# Patient Record
Sex: MALE | Race: WHITE | NOT HISPANIC OR LATINO | Employment: OTHER | URBAN - METROPOLITAN AREA
[De-identification: names, ages, dates, MRNs, and addresses within clinical notes are randomized per-mention and may not be internally consistent; named-entity substitution may affect disease eponyms.]

---

## 2017-12-15 ENCOUNTER — OFFICE VISIT (OUTPATIENT)
Dept: WOUND CARE | Facility: CLINIC | Age: 82
End: 2017-12-15
Payer: MEDICARE

## 2017-12-15 VITALS
SYSTOLIC BLOOD PRESSURE: 168 MMHG | WEIGHT: 132.94 LBS | HEART RATE: 73 BPM | BODY MASS INDEX: 19.07 KG/M2 | DIASTOLIC BLOOD PRESSURE: 80 MMHG

## 2017-12-15 DIAGNOSIS — Z43.6 ATTENTION TO UROSTOMY: Primary | ICD-10-CM

## 2017-12-15 PROCEDURE — 99203 OFFICE O/P NEW LOW 30 MIN: CPT | Mod: PBBFAC | Performed by: CLINICAL NURSE SPECIALIST

## 2017-12-15 PROCEDURE — 99999 PR PBB SHADOW E&M-NEW PATIENT-LVL III: CPT | Mod: PBBFAC,,, | Performed by: CLINICAL NURSE SPECIALIST

## 2017-12-15 PROCEDURE — 99202 OFFICE O/P NEW SF 15 MIN: CPT | Mod: S$PBB,,, | Performed by: CLINICAL NURSE SPECIALIST

## 2017-12-15 RX ORDER — LOSARTAN POTASSIUM 50 MG/1
50 TABLET ORAL DAILY
COMMUNITY

## 2017-12-15 NOTE — PATIENT INSTRUCTIONS
UROSTOMY CARE     TYPICALLY CHANGE THE POUCH  2  TIMES  A  WEEK  OR  IF IT LEAKS     REMOVE OLD POUCH  (OK TO USE ADHESIVE REMOVER TO TAKE POUCH OFF AND REMOVE ANY EXCESS ADHESIVE ON SKIN )    CLEAN SKIN WITH WATER ONLY   DRY WELL,   APPLY THE NEW POUCH ( ON DRY SKIN )   APPLY LIGHT PRESSURE TO THE AREA AROUND THE STOMA    OSTOMY BELT OPTIONAL  CHECK PATIENTS NEED TO EMPTY FREQUENTLY OR PUT HIM ON A SCHEDULE BUT DON'T LET BAG GET MORE THAN 1/3 -1/2 FULL

## 2017-12-15 NOTE — PROGRESS NOTES
"Subjective:       Patient ID: Tino Mendoza is a 88 y.o. male.    Chief Complaint: Urostomy    This is a new pt who was brought to see me today by his daughter, he is currently living at [facility] in the Memory unit, he was just relocated here by his daughter and he cannot care for all his ADL's . He has a urostomy and she had some concerns as well as wants directions for the staff caring for him, she had located my name on the internet . He walks in without assist, but is not oriented to time place.       Review of Systems   Constitutional: Negative for activity change and appetite change.   Respiratory: Negative for cough and shortness of breath.    Cardiovascular: Negative for chest pain and leg swelling.   Gastrointestinal: Negative.    Genitourinary: Negative for hematuria.   Skin: Negative for rash and wound.       Objective:      Physical Exam   Constitutional: He is oriented to person, place, and time. He appears well-developed and well-nourished.   Frail elderly   Cardiovascular: Normal rate and regular rhythm.    Pulmonary/Chest: Effort normal. No respiratory distress.   Abdominal: Soft. Bowel sounds are normal.   Musculoskeletal: Normal range of motion. He exhibits no edema.   Neurological: He is alert and oriented to person, place, and time.   Skin: Skin is warm and dry. No erythema.   Psychiatric:   Dementia       Has a long standing urinary stoma, it is 7/8" budded end, skin is clear, has periodic small skin eruptions but basically does well, he wears [place] convex uro pre cut 1"   I gave written instructions for care     Assessment:       1. Attention to urostomy        Plan:       Urostomy eval and review of essential skin care  Nori is DME  Daughter to educate the staff at [facility] on care of stoma   Return as needed.   I have reviewed the plan of care with the patient's  Caregiver/daughter Clara  and she express understanding. I spent over 50% of this 20 minute visit in face to face " counseling.

## 2018-08-27 ENCOUNTER — RESEARCH ENCOUNTER (OUTPATIENT)
Dept: NEUROLOGY | Facility: CLINIC | Age: 83
End: 2018-08-27

## 2018-08-27 NOTE — PROGRESS NOTES
Consenting occurred at [facility] with subject's Legally Authorized Representative.       Study title: [facility]    IRB #: [id number]  IRB approval date: 07/26/2018  Sponsor: Kei       Informed Consent Process  Present for discussion: Yes  Is LAR Consenting for Subject: Yes     Prior to the Informed Consent (IC) being signed, or any protocol required testing, procedure, or intervention being performed, the following was done or discussed:  · Purpose of the Study, Qualifications to Participate: yes  · Study Design, Schedule and Procedures: yes  · Risks, Benefits, Alternative Treatments, Compensation and Costs: yes  · Confidentiality and HIPAA Authorization for Release of Medical Records for the research trial/subject's right/study related injury: yes  · Study related contact information: yes  · Voluntary Participation and Withdrawal from the research trial at any time: yes  · Optional samples/procedures (if applicable): yes  · Patient has been offered the opportunity to ask questions regarding the study and all questions were answered satisfactorily: yes  · Patient verbalizes understanding of the study/procedures and agrees to participate: yes  · CRC and PI contact information given to patient: yes  · Signed copy given to patient: Emailed to LAR  · Copy in patient's chart and original uploaded to Epic: yes     Person Obtaining Consent: Maria A Barros     Research study explained to LAR in group setting. LAR given time to review and consider trial and ask all necessary questions. Questions about ipad were expressed but study team ensured [facility] staff would assist with all operations. Subject's LAR requested dementia family member not to be present and not to sign ICF in order to avoid confusion.     Subject family members Clara Mendoza and Lucille Mendoza also signed separate consents as they will be given questionnaires to complete.

## 2018-09-13 ENCOUNTER — RESEARCH ENCOUNTER (OUTPATIENT)
Dept: RESEARCH | Facility: HOSPITAL | Age: 83
End: 2018-09-13

## 2018-09-28 ENCOUNTER — RESEARCH ENCOUNTER (OUTPATIENT)
Dept: NEUROLOGY | Facility: CLINIC | Age: 83
End: 2018-09-28

## 2018-10-02 NOTE — PROGRESS NOTES
STACY Marina, PhD completed one month follow up visit via telemedicine with subject and staff.    Reviewed goals of:     NAME Tino Mendoza Goals Tasks Status   Visit 1: 8/30/18 C: Not collected      B: Not collected     P: consider memory medication if desired by family     E: None C:     B:      P: Discuss with family     E: None C:     B:      P: wife is adamant against it     E: NA   Visit 2: 9/27/18 C: Add music     B:  NA     P: Decrease nighttime behaviors     E: None C: Add C[place] music/iPad     B:  NA     P: Continue tracking; try to add music in evening     E: None  C:     B:  NA     P: tracking done     E: NA    C=cognitive  B=Behavioral  P=Physical/Medical  E=Environmental

## 2018-10-29 ENCOUNTER — RESEARCH ENCOUNTER (OUTPATIENT)
Dept: NEUROLOGY | Facility: CLINIC | Age: 83
End: 2018-10-29

## 2018-10-29 NOTE — PROGRESS NOTES
PI LATOSHA Marina, PhD completed one month follow up visit via telemedicine with subject and staff.    Reviewed goals of:

## 2018-11-01 PROBLEM — F03.90 DEMENTIA: Status: ACTIVE | Noted: 2018-11-01

## 2018-11-30 ENCOUNTER — OFFICE VISIT (OUTPATIENT)
Dept: NEUROLOGY | Facility: CLINIC | Age: 83
End: 2018-11-30

## 2018-11-30 DIAGNOSIS — F03.90 MAJOR NEUROCOGNITIVE DISORDER: Primary | ICD-10-CM

## 2018-11-30 PROCEDURE — 99499 UNLISTED E&M SERVICE: CPT | Mod: S$GLB,,, | Performed by: CLINICAL NEUROPSYCHOLOGIST

## 2019-01-04 ENCOUNTER — OFFICE VISIT (OUTPATIENT)
Dept: NEUROLOGY | Facility: CLINIC | Age: 84
End: 2019-01-04

## 2019-01-04 DIAGNOSIS — F03.90 DEMENTIA WITHOUT BEHAVIORAL DISTURBANCE, UNSPECIFIED DEMENTIA TYPE: ICD-10-CM

## 2019-01-04 PROCEDURE — 99499 UNLISTED E&M SERVICE: CPT | Mod: S$GLB,,, | Performed by: PSYCHIATRY & NEUROLOGY

## 2019-01-04 PROCEDURE — 99499 NO LOS: ICD-10-PCS | Mod: S$GLB,,, | Performed by: PSYCHIATRY & NEUROLOGY

## 2019-01-07 NOTE — RESEARCH
[facility] Research Televisit Note           Consultation started: 1/4/19 at 10:15AM   The patient's room #: 07  Spoke CNA at bedside with patient assisting consultant. Also present with the patient at the time of the consultation:      HISTORY OF PRESENT ILLNESS AND CURRENT CONCERNS:  Tino Mendoza is a 89 y.o. male who presents with dementia. They are a participant in the [facility] dementia research intervention.      Previous Month      Falls: 0   ER Visits: 0   Hospitalizations: 0      Treatment Plan and Update        Follow-up on FEB TBD

## 2019-02-02 ENCOUNTER — OFFICE VISIT (OUTPATIENT)
Dept: NEUROLOGY | Facility: CLINIC | Age: 84
End: 2019-02-02

## 2019-02-02 DIAGNOSIS — F03.90 DEMENTIA WITHOUT BEHAVIORAL DISTURBANCE, UNSPECIFIED DEMENTIA TYPE: ICD-10-CM

## 2019-02-02 PROCEDURE — 99499 NO LOS: ICD-10-PCS | Mod: S$GLB,,, | Performed by: PSYCHIATRY & NEUROLOGY

## 2019-02-02 PROCEDURE — 99499 UNLISTED E&M SERVICE: CPT | Mod: S$GLB,,, | Performed by: PSYCHIATRY & NEUROLOGY

## 2019-02-04 NOTE — PROGRESS NOTES
[facility] Research Televisit Note           Consultation started: 2/2/19 @ 10:45AM  The patient's room #: 07  Spoke CNA at bedside with patient assisting consultant. Also present with the patient at the time of the consultation:      HISTORY OF PRESENT ILLNESS AND CURRENT CONCERNS:  Tino Mendoza is a 89 y.o. male who presents with dementia. They are a participant in the [facility] dementia research intervention.      Previous Month      Falls:0    ER Visits: 0   Hospitalizations: 0      Treatment Plan and Update          Follow-up in March

## 2019-03-12 ENCOUNTER — RESEARCH ENCOUNTER (OUTPATIENT)
Dept: NEUROLOGY | Facility: CLINIC | Age: 84
End: 2019-03-12

## 2019-03-12 NOTE — PROGRESS NOTES
Dr. Marina completed 6 month follow up visit via telemedicine with subject and staff.       HISTORY OF PRESENT ILLNESS AND CURRENT CONCERNS:  Tino Mendoza is a 89 y.o. male who presents with dementia. They are a participant in the [facility] dementia research intervention.      Previous Month      Falls:1 fall in Jan    ER Visits:0    Hospitalizations:0      Wellness plan not completed by PI.  Completed MOCA (sum=2), CDR, and HSQ.    C=cognitive  B=Behavioral  P=Physical/Medical  E=Environmental

## 2019-04-05 ENCOUNTER — HOSPITAL ENCOUNTER (EMERGENCY)
Facility: HOSPITAL | Age: 84
Discharge: HOME | End: 2019-04-05
Attending: EMERGENCY MEDICINE
Payer: MEDICARE

## 2019-04-05 VITALS
WEIGHT: 120 LBS | RESPIRATION RATE: 20 BRPM | OXYGEN SATURATION: 97 % | BODY MASS INDEX: 17.77 KG/M2 | TEMPERATURE: 99 F | HEART RATE: 81 BPM | SYSTOLIC BLOOD PRESSURE: 169 MMHG | HEIGHT: 69 IN | DIASTOLIC BLOOD PRESSURE: 84 MMHG

## 2019-04-05 DIAGNOSIS — S51.812A SKIN TEAR OF FOREARM WITHOUT COMPLICATION, LEFT, INITIAL ENCOUNTER: Primary | ICD-10-CM
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
## 2019-04-05 LAB
ALBUMIN SERPL BCP-MCNC: 3.3 G/DL (ref 3.5–5.2)
ALP SERPL-CCNC: 137 U/L (ref 55–135)
ALT SERPL W/O P-5'-P-CCNC: 12 U/L (ref 10–44)
ANION GAP SERPL CALC-SCNC: 12 MMOL/L (ref 8–16)
AST SERPL-CCNC: 24 U/L (ref 10–40)
BACTERIA #/AREA URNS AUTO: ABNORMAL /HPF
BASOPHILS # BLD AUTO: 0.03 K/UL (ref 0–0.2)
BASOPHILS NFR BLD: 0.3 % (ref 0–1.9)
BILIRUB SERPL-MCNC: 0.5 MG/DL (ref 0.1–1)
BILIRUB UR QL STRIP: NEGATIVE
BUN SERPL-MCNC: 19 MG/DL (ref 8–23)
CALCIUM SERPL-MCNC: 9.3 MG/DL (ref 8.7–10.5)
CHLORIDE SERPL-SCNC: 105 MMOL/L (ref 95–110)
CLARITY UR REFRACT.AUTO: ABNORMAL
CO2 SERPL-SCNC: 22 MMOL/L (ref 23–29)
COLOR UR AUTO: YELLOW
CREAT SERPL-MCNC: 1.1 MG/DL (ref 0.5–1.4)
DIFFERENTIAL METHOD: ABNORMAL
EOSINOPHIL # BLD AUTO: 0.5 K/UL (ref 0–0.5)
EOSINOPHIL NFR BLD: 4.9 % (ref 0–8)
ERYTHROCYTE [DISTWIDTH] IN BLOOD BY AUTOMATED COUNT: 14.6 % (ref 11.5–14.5)
EST. GFR  (AFRICAN AMERICAN): >60 ML/MIN/1.73 M^2
EST. GFR  (NON AFRICAN AMERICAN): 58.8 ML/MIN/1.73 M^2
GLUCOSE SERPL-MCNC: 102 MG/DL (ref 70–110)
GLUCOSE UR QL STRIP: NEGATIVE
HCT VFR BLD AUTO: 39.1 % (ref 40–54)
HGB BLD-MCNC: 12.7 G/DL (ref 14–18)
HGB UR QL STRIP: NEGATIVE
HYALINE CASTS UR QL AUTO: 0 /LPF
IMM GRANULOCYTES # BLD AUTO: 0.02 K/UL (ref 0–0.04)
IMM GRANULOCYTES NFR BLD AUTO: 0.2 % (ref 0–0.5)
KETONES UR QL STRIP: NEGATIVE
LEUKOCYTE ESTERASE UR QL STRIP: ABNORMAL
LYMPHOCYTES # BLD AUTO: 1.3 K/UL (ref 1–4.8)
LYMPHOCYTES NFR BLD: 13.2 % (ref 18–48)
MCH RBC QN AUTO: 30.5 PG (ref 27–31)
MCHC RBC AUTO-ENTMCNC: 32.5 G/DL (ref 32–36)
MCV RBC AUTO: 94 FL (ref 82–98)
MICROSCOPIC COMMENT: ABNORMAL
MONOCYTES # BLD AUTO: 0.9 K/UL (ref 0.3–1)
MONOCYTES NFR BLD: 9.1 % (ref 4–15)
NEUTROPHILS # BLD AUTO: 7.3 K/UL (ref 1.8–7.7)
NEUTROPHILS NFR BLD: 72.3 % (ref 38–73)
NITRITE UR QL STRIP: NEGATIVE
NRBC BLD-RTO: 0 /100 WBC
PH UR STRIP: 6 [PH] (ref 5–8)
PLATELET # BLD AUTO: 241 K/UL (ref 150–350)
PMV BLD AUTO: 11.4 FL (ref 9.2–12.9)
POTASSIUM SERPL-SCNC: 3.7 MMOL/L (ref 3.5–5.1)
PROT SERPL-MCNC: 6.8 G/DL (ref 6–8.4)
PROT UR QL STRIP: ABNORMAL
RBC # BLD AUTO: 4.17 M/UL (ref 4.6–6.2)
RBC #/AREA URNS AUTO: 2 /HPF (ref 0–4)
SODIUM SERPL-SCNC: 139 MMOL/L (ref 136–145)
SP GR UR STRIP: 1.01 (ref 1–1.03)
TROPONIN I SERPL DL<=0.01 NG/ML-MCNC: 0.01 NG/ML (ref 0–0.03)
URN SPEC COLLECT METH UR: ABNORMAL
WBC # BLD AUTO: 10.09 K/UL (ref 3.9–12.7)
WBC #/AREA URNS AUTO: 8 /HPF (ref 0–5)

## 2019-04-05 PROCEDURE — 84484 ASSAY OF TROPONIN QUANT: CPT

## 2019-04-05 PROCEDURE — 25000003 PHARM REV CODE 250: Performed by: EMERGENCY MEDICINE

## 2019-04-05 PROCEDURE — 80053 COMPREHEN METABOLIC PANEL: CPT

## 2019-04-05 PROCEDURE — 81001 URINALYSIS AUTO W/SCOPE: CPT

## 2019-04-05 PROCEDURE — 93005 ELECTROCARDIOGRAM TRACING: CPT

## 2019-04-05 PROCEDURE — 99284 PR EMERGENCY DEPT VISIT,LEVEL IV: ICD-10-PCS | Mod: ,,, | Performed by: EMERGENCY MEDICINE

## 2019-04-05 PROCEDURE — 93010 EKG 12-LEAD: ICD-10-PCS | Mod: ,,, | Performed by: INTERNAL MEDICINE

## 2019-04-05 PROCEDURE — 85025 COMPLETE CBC W/AUTO DIFF WBC: CPT

## 2019-04-05 PROCEDURE — 99284 EMERGENCY DEPT VISIT MOD MDM: CPT | Mod: ,,, | Performed by: EMERGENCY MEDICINE

## 2019-04-05 PROCEDURE — 93010 ELECTROCARDIOGRAM REPORT: CPT | Mod: ,,, | Performed by: INTERNAL MEDICINE

## 2019-04-05 PROCEDURE — 99285 EMERGENCY DEPT VISIT HI MDM: CPT | Mod: 25

## 2019-04-05 RX ORDER — BACITRACIN 500 [USP'U]/G
OINTMENT TOPICAL
Status: DISCONTINUED | OUTPATIENT
Start: 2019-04-05 | End: 2019-04-06 | Stop reason: HOSPADM

## 2019-04-05 RX ORDER — LORAZEPAM 1 MG/1
1 TABLET ORAL
Status: COMPLETED | OUTPATIENT
Start: 2019-04-05 | End: 2019-04-05

## 2019-04-05 RX ORDER — HALOPERIDOL 5 MG/1
5 TABLET ORAL
Status: COMPLETED | OUTPATIENT
Start: 2019-04-05 | End: 2019-04-05

## 2019-04-05 RX ORDER — TRAZODONE HYDROCHLORIDE 50 MG/1
50 TABLET ORAL NIGHTLY
Status: DISCONTINUED | OUTPATIENT
Start: 2019-04-05 | End: 2019-04-06 | Stop reason: HOSPADM

## 2019-04-05 RX ADMIN — TRAZODONE HYDROCHLORIDE 50 MG: 50 TABLET ORAL at 08:04

## 2019-04-05 RX ADMIN — LORAZEPAM 1 MG: 1 TABLET ORAL at 07:04

## 2019-04-05 RX ADMIN — HALOPERIDOL 5 MG: 5 TABLET ORAL at 05:04

## 2019-04-05 NOTE — ED NOTES
Patient identifiers verified and correct for Tino Mendoza.   LOC: The patient is awake, alert and aware of environment with an appropriate affect, the patient is oriented to person and place  APPEARANCE: Patient appears comfortable and in no acute distress, patient is clean and well groomed.  SKIN: The skin is warm and dry, color consistent with ethnicity, patient has normal skin turgor and moist mucus membranes, Pt has skin tear to left forearm.  MUSCULOSKELETAL: Patient moving all extremities spontaneously, no swelling noted.   RESPIRATORY: Airway is open and patent, respirations are spontaneous, patient has a normal effort and rate, no accessory muscle use noted, pt placed on continuous pulse ox with O2 sats noted at 97% on room air.  CARDIAC: Pt placed on cardiac monitor. Patient has a julianna rate and regular rhythm, no edema noted, capillary refill < 3 seconds.   GASTRO: Soft and non tender to palpation, no distention noted, normoactive bowel sounds present in all four quadrants. Pt states bowel movements have been regular.  : Pt has urostomy bag RLQ  NEURO: Pt opens eyes spontaneously, behavior appropriate to situation, follows commands, facial expression symmetrical, bilateral hand grasp equal and even, purposeful motor response noted, normal sensation in all extremities when touched with a finger.

## 2019-04-05 NOTE — ED PROVIDER NOTES
"Encounter Date: 4/5/2019    SCRIBE #1 NOTE: I, Son Latrice, am scribing for, and in the presence of,  Dr. Seaman . I have scribed the following portions of the note - Other sections scribed: HPI ROS PE .       History     Chief Complaint   Patient presents with    Fall     Slip and fall at [facility]. no LOC, no head injury. No blood thinners. Wound to left forearm, wrapped per staff at facility. hx of dementia and unsteady gait.      89 y.o. male with past medical history of alzheimer, HTN presents with a chief complaint of left forearm and left shoulder pain. According to the family member, the nursing facility contacted them that the patient had slipped and fell landing onto his left side earlier today. They were noted that the patient had scraped his left forearm and was complaining of left arm and left shoulder pain. They notes that he has been falling more frequently lately. Currently, he denies any pain but reports  "left knee soreness." His tetanus is out of date. Patient is not on anticoagulants. History is limited because he is demented; however, the patient is at baseline according to the patient's family members.     The history is provided by medical records and a relative. The history is limited by the condition of the patient.     Review of patient's allergies indicates:  No Known Allergies  Past Medical History:   Diagnosis Date    Bladder cancer 2/3/2015    Bladder, Prostate removal and  Permanent ileostomy 2010    GERD (gastroesophageal reflux disease) 2/3/2015    High cholesterol 2/3/2015    HTN (hypertension) 2/3/2015    Hx SBO 2/3/2015    recurrent    Mitral valve regurgitation 2/3/2015    1/15 - 2+     Past Surgical History:   Procedure Laterality Date    BLADDER TUMOR EXCISION  2010    PROSTATE SURGERY       Family History   Problem Relation Age of Onset    Cancer Son         breast     Social History     Tobacco Use    Smoking status: Former Smoker    Smokeless tobacco: " Former User     Quit date: 1/1/1950   Substance Use Topics    Alcohol use: No    Drug use: Not on file     Review of Systems   Unable to perform ROS: Dementia   Skin: Positive for wound (left forearm).       Physical Exam     Initial Vitals [04/05/19 1419]   BP Pulse Resp Temp SpO2   121/64 (!) 57 18 98.5 °F (36.9 °C) 97 %      MAP       --         Physical Exam    Nursing note and vitals reviewed.  Constitutional: He appears well-developed and well-nourished. He is not diaphoretic. No distress.   HENT:   Head: Normocephalic and atraumatic.   Nose: Nose normal.   Eyes: Conjunctivae and EOM are normal. Pupils are equal, round, and reactive to light.   Neck: Normal range of motion. Neck supple.   Cardiovascular: Normal rate and regular rhythm.   Pulmonary/Chest: Breath sounds normal. No respiratory distress. He has no wheezes. He has no rhonchi. He has no rales. He exhibits no tenderness.   Abdominal: Soft. Bowel sounds are normal. He exhibits no distension. There is no tenderness.   Musculoskeletal: Normal range of motion. He exhibits edema.   1+ pitting edema bilaterally    Neurological: He is alert and oriented to person, place, and time. He has normal strength.   Skin: Skin is warm and dry. No rash noted.   6 cm, 2 cm, 1 cm skin tear on his left forearm    Psychiatric: He has a normal mood and affect. His behavior is normal. Thought content normal.         ED Course   Procedures  Labs Reviewed   CBC W/ AUTO DIFFERENTIAL - Abnormal; Notable for the following components:       Result Value    RBC 4.17 (*)     Hemoglobin 12.7 (*)     Hematocrit 39.1 (*)     RDW 14.6 (*)     Lymph% 13.2 (*)     All other components within normal limits   COMPREHENSIVE METABOLIC PANEL - Abnormal; Notable for the following components:    CO2 22 (*)     Albumin 3.3 (*)     Alkaline Phosphatase 137 (*)     eGFR if non  58.8 (*)     All other components within normal limits   URINALYSIS, REFLEX TO URINE CULTURE -  Abnormal; Notable for the following components:    Appearance, UA Hazy (*)     Protein, UA 2+ (*)     Leukocytes, UA Trace (*)     All other components within normal limits    Narrative:     Preferred Collection Type->Urine, Clean Catch   URINALYSIS MICROSCOPIC - Abnormal; Notable for the following components:    WBC, UA 8 (*)     Bacteria, UA Few (*)     All other components within normal limits    Narrative:     Preferred Collection Type->Urine, Clean Catch   TROPONIN I     EKG Readings: (Independently Interpreted)   Sinus Rhythm at rate of 64, PVC, LVH, , , QRS 90,      ECG Results          EKG 12-lead (Final result)  Result time 04/05/19 18:33:04    Final result by Interface, Lab In [facility] (04/05/19 18:33:04)                 Narrative:    Test Reason : W19.XXXA,    Vent. Rate : 064 BPM     Atrial Rate : 064 BPM     P-R Int : 168 ms          QRS Dur : 090 ms      QT Int : 448 ms       P-R-T Axes : 076 -23 078 degrees     QTc Int : 462 ms    Sinus rhythm with occasional Premature ventricular complexes  Possible Left atrial enlargement  LVH  Abnormal ECG  No previous ECGs available  Confirmed by JORDAN VAZQUEZ MD ([id number]) on 4/5/2019 6:32:58 PM    Referred By: [email or web address]   SELF           Confirmed By:JORDAN VAZQUEZ MD                            Imaging Results          CT Head Without Contrast (Final result)  Result time 04/05/19 21:26:52    Final result by Juan Jose Flores MD (04/05/19 21:26:52)                 Impression:      Limited examination secondary to patient motion.    CT head:    1. No evidence of acute intracranial pathology.  2. Senescent changes.  3. Mild paranasal sinus disease as above.  CT cervical spine:    1. No fracture or traumatic malalignment in the cervical spine.  2. Degenerative changes of the cervical spine most prominent at C5-C6 and C6-C7 where there is mild spinal canal stenosis.  Multilevel variable mild to severe neural foraminal narrowing.    Electronically signed by resident:  Nii Pratt  Date:    04/05/2019  Time:    20:45    Electronically signed by: Juan Jose Flores MD  Date:    04/05/2019  Time:    21:26             Narrative:    EXAMINATION:  CT HEAD WITHOUT CONTRAST; CT CERVICAL SPINE WITHOUT CONTRAST    CLINICAL HISTORY:  Head trauma, ataxia; C-spine trauma, NEXUS/CCR positive, +risk factor(s)    TECHNIQUE:  Low dose axial CT images obtained throughout the head and cervical spine without intravenous contrast.  Axial, sagittal and coronal reconstructions were performed.  The head portion of the examination is degraded by motion.    COMPARISON:  None.    FINDINGS:  Limited examination secondary to patient motion.    Head:    Generalized cerebral volume loss with compensatory widening of the sulci and ventricular system.    No extra-axial blood or fluid collections.Patchy hypoattenuation of the supratentorial white matter consistent with chronic microvascular ischemic change.  No evidence of acute intracranial hemorrhage or major vascular distribution infarct.  No significant mass effect or midline shift.    No displaced calvarial fracture identified.  Mild mucosal thickening of the right maxillary sinus and ethmoid air cells.  Remaining paranasal sinuses and mastoid air cells are clear.    Spine:    Degenerative changes of the atlantoaxial articulation.  Otherwise the craniocervical junction is within normal limits.    Mild straightening of the cervical lordosis.  No spondylolisthesis.    Vertebral body heights are maintained.  No acute fracture.    Significant disc space height loss and endplate sclerosis of C4-C5, C5-C6, and C6-C7.    Significant facet arthropathy of the cervical spine, left greater than right.  Multilevel uncovertebral spurring and posterior disc osteophyte complexes, most prominent in the lower cervical spinal levels.  Mild spinal canal stenosis C5-C6 and C6-C7.  Multilevel variable mild to severe neural foraminal narrowing.    The paraspinal soft tissue  structures exhibit no acute abnormalities.    Calcific atherosclerosis of the bilateral carotid bifurcations.  Thyroid gland atrophy.  Mild biapical fibronodular changes.                               CT Cervical Spine Without Contrast (Final result)  Result time 04/05/19 21:26:52    Final result by Juan Jose Flores MD (04/05/19 21:26:52)                 Impression:      Limited examination secondary to patient motion.    CT head:    1. No evidence of acute intracranial pathology.  2. Senescent changes.  3. Mild paranasal sinus disease as above.  CT cervical spine:    1. No fracture or traumatic malalignment in the cervical spine.  2. Degenerative changes of the cervical spine most prominent at C5-C6 and C6-C7 where there is mild spinal canal stenosis.  Multilevel variable mild to severe neural foraminal narrowing.    Electronically signed by resident: Nii Pratt  Date:    04/05/2019  Time:    20:45    Electronically signed by: Juan Jose Flores MD  Date:    04/05/2019  Time:    21:26             Narrative:    EXAMINATION:  CT HEAD WITHOUT CONTRAST; CT CERVICAL SPINE WITHOUT CONTRAST    CLINICAL HISTORY:  Head trauma, ataxia; C-spine trauma, NEXUS/CCR positive, +risk factor(s)    TECHNIQUE:  Low dose axial CT images obtained throughout the head and cervical spine without intravenous contrast.  Axial, sagittal and coronal reconstructions were performed.  The head portion of the examination is degraded by motion.    COMPARISON:  None.    FINDINGS:  Limited examination secondary to patient motion.    Head:    Generalized cerebral volume loss with compensatory widening of the sulci and ventricular system.    No extra-axial blood or fluid collections.Patchy hypoattenuation of the supratentorial white matter consistent with chronic microvascular ischemic change.  No evidence of acute intracranial hemorrhage or major vascular distribution infarct.  No significant mass effect or midline shift.    No displaced calvarial fracture  identified.  Mild mucosal thickening of the right maxillary sinus and ethmoid air cells.  Remaining paranasal sinuses and mastoid air cells are clear.    Spine:    Degenerative changes of the atlantoaxial articulation.  Otherwise the craniocervical junction is within normal limits.    Mild straightening of the cervical lordosis.  No spondylolisthesis.    Vertebral body heights are maintained.  No acute fracture.    Significant disc space height loss and endplate sclerosis of C4-C5, C5-C6, and C6-C7.    Significant facet arthropathy of the cervical spine, left greater than right.  Multilevel uncovertebral spurring and posterior disc osteophyte complexes, most prominent in the lower cervical spinal levels.  Mild spinal canal stenosis C5-C6 and C6-C7.  Multilevel variable mild to severe neural foraminal narrowing.    The paraspinal soft tissue structures exhibit no acute abnormalities.    Calcific atherosclerosis of the bilateral carotid bifurcations.  Thyroid gland atrophy.  Mild biapical fibronodular changes.                               X-Ray Humerus 2 View Left (Final result)  Result time 04/05/19 16:07:31    Final result by Josse Harry MD (04/05/19 16:07:31)                 Impression:      See above      Electronically signed by: Josse Harry MD  Date:    04/05/2019  Time:    16:07             Narrative:    EXAMINATION:  XR HUMERUS 2 VIEW LEFT    CLINICAL HISTORY:  Unspecified fall, initial encounter    COMPARISON:  None    FINDINGS:  No fracture or dislocation.  No bone destruction identified                               X-Ray Shoulder Trauma Left (Final result)  Result time 04/05/19 16:07:05    Final result by Josse aHrry MD (04/05/19 16:07:05)                 Impression:      See above      Electronically signed by: Josse Harry MD  Date:    04/05/2019  Time:    16:07             Narrative:    EXAMINATION:  XR SHOULDER TRAUMA 3 VIEW LEFT    CLINICAL HISTORY:  Unspecified fall, initial  encounter    TECHNIQUE:  Three views of the left shoulder were performed.    COMPARISON  None    FINDINGS:  DJD and joint space narrowing.  No acute fracture or dislocation.  No bone destruction identified                               X-Ray Pelvis Routine AP (Final result)  Result time 04/05/19 16:08:00    Final result by Josse Harry MD (04/05/19 16:08:00)                 Impression:      See above      Electronically signed by: Josse Harry MD  Date:    04/05/2019  Time:    16:08             Narrative:    EXAMINATION:  XR PELVIS ROUTINE AP    CLINICAL HISTORY:  Unspecified fall, initial encounter    TECHNIQUE:  AP view of the pelvis was performed.    COMPARISON:  None.    FINDINGS:  Postoperative changes identified in the pelvis.  DJD and hip joint space narrowing bilaterally.  No fracture or bone destruction identified                               X-Ray Chest AP Portable (Final result)  Result time 04/05/19 16:06:18    Final result by Josse Harry MD (04/05/19 16:06:18)                 Impression:      See above      Electronically signed by: Josse Harry MD  Date:    04/05/2019  Time:    16:06             Narrative:    EXAMINATION:  XR CHEST AP PORTABLE    CLINICAL HISTORY:  Unspecified fall, initial encounter    TECHNIQUE:  Single frontal view of the chest was performed.    COMPARISON:  None    FINDINGS:  Mild cardiomegaly.  No significant airspace consolidation or pleural effusion identified.  Few granulomas noted.                                 Medical Decision Making:   History:   Old Medical Records: I decided to obtain old medical records.  Initial Assessment:   89M with PMH dementia presenting from NH after mechanical fall, landing on left side and sustaining skin tear on left arm. Pt has full ROM of all extremities including knee and LUE, however appears to have slight deformity of left shoulder (may be chronic 2/2 habitus) and will obtain XR of L shoulder and humerus, along with pelvic XR, CT  head and C-spine as pt is poor historian, and CXR and labs to identify potential etiology of weakness causing fall.   Differential Diagnosis:   Fracture vs dislocation vs sprain vs laceration vs ICH vs SDH vs EDH vs electrolyte abnormality vs anemia vs infection  Independently Interpreted Test(s):   I have ordered and independently interpreted EKG Reading(s) - see prior notes  Clinical Tests:   Lab Tests: Ordered and Reviewed  Radiological Study: Ordered and Reviewed  Medical Tests: Ordered and Reviewed  ED Management:  XR neg for acute pathology. Forearm irrigated with NS and skin approximated with steristrips and xeroform. Tetanus update ordered.     7:53 PM  Patient has become increasingly agitated which family member states is consistent with his sundowning, unable to tolerate CT despite being given Haldol and Ativan.  Patient normally takes trazodone 50 mg at night for sleep, will give trazodone and re-attempt CT.    Pt ultimately became more sedated, permitting CT of head and C-spine which were negative. Bleeding progressed through xeroform dressing, applied additional nonstick gauze with padding gauze and additional Kerlex to protect against accidental injury due to pt's dementia. Pt will be d/c back to nursing facility with plan f/u Plastics for skin tear re-evaluation, family are comfortable with discharge plan.             Scribe Attestation:   Scribe #1: I performed the above scribed service and the documentation accurately describes the services I performed. I attest to the accuracy of the note.               Clinical Impression:       ICD-10-CM ICD-9-CM   1. Skin tear of forearm without complication, left, initial encounter S51.812A 881.00   2. Fall W19.XXXA E888.9         Disposition:   Disposition: Discharged                        Janina Seaman MD  04/08/19 1321

## 2019-04-05 NOTE — DISCHARGE INSTRUCTIONS
You were seen in the emergency department for a skin tear after a fall.  Head CT and C-spine CT, as well as x-rays of your chest, left shoulder, left arm were negative for fracture.  You have a skin tear that was cleaned and closed with skin tape.  Keep this area clean and dry. If you need to change the bandages, only change the white bandages - do not remove the yellow dressing.  Follow up with Plastic surgery for re-evaluation next week.  Return to the emergency department for worsening bleeding, pain, swelling, fever or chills, confusion, dizziness, or other concerning symptoms.

## 2019-04-06 NOTE — ED NOTES
Report received from YASMEEN Contreras. Assume care of pt. Pt resting comfortably and independently repositioned in stretcher with bed locked in lowest position for safety. NAD noted at this time. Respirations even and unlabored and visible chest rise noted. Will contact CT shortly for re-attempt of head CT after Ativan given. Family at bedside. No needs at this time. Will continue to monitor.

## 2019-04-06 NOTE — ED NOTES
Bed: [facility]  Expected date:   Expected time:   Means of arrival:   Comments:

## 2019-04-06 NOTE — ED NOTES
Report given to BRYSON Alicea at [facility]. Instructed her to set up transport for pt to return to [facility].

## 2019-04-06 NOTE — ED NOTES
Pt soiled diaper. Pt cleaned. Diaper and linen changed. Pt tolerated well. No skin breakdown noticed to buttocks.

## 2019-04-08 ENCOUNTER — OFFICE VISIT (OUTPATIENT)
Dept: NEUROLOGY | Facility: CLINIC | Age: 84
End: 2019-04-08

## 2019-04-08 DIAGNOSIS — Z00.6 RESEARCH EXAM: Primary | ICD-10-CM

## 2019-04-08 PROCEDURE — 99499 NO LOS: ICD-10-PCS | Mod: S$GLB,,, | Performed by: CLINICAL NEUROPSYCHOLOGIST

## 2019-04-08 PROCEDURE — 99499 UNLISTED E&M SERVICE: CPT | Mod: S$GLB,,, | Performed by: CLINICAL NEUROPSYCHOLOGIST

## 2019-04-08 NOTE — PROGRESS NOTES
[facility] Research Televisit Note           Consultation started:   The patient's room #: r07  Spoke CNA at bedside with patient assisting consultant. Also present with the patient at the time of the consultation:      HISTORY OF PRESENT ILLNESS AND CURRENT CONCERNS:  Tino Mendoza is a 89 y.o. male who presents with dementia. They are a participant in the [facility] dementia research intervention.      Previous Month      Falls: 0   ER Visits: 0    Hospitalizations: 0      Treatment Plan and Update          Follow-up in 1 month

## 2019-04-10 ENCOUNTER — OFFICE VISIT (OUTPATIENT)
Dept: PLASTIC SURGERY | Facility: CLINIC | Age: 84
End: 2019-04-10
Payer: MEDICARE

## 2019-04-10 VITALS — SYSTOLIC BLOOD PRESSURE: 128 MMHG | HEART RATE: 55 BPM | DIASTOLIC BLOOD PRESSURE: 69 MMHG

## 2019-04-10 DIAGNOSIS — Z09 SURGERY FOLLOW-UP EXAMINATION: Primary | ICD-10-CM

## 2019-04-10 PROCEDURE — 99999 PR PBB SHADOW E&M-EST. PATIENT-LVL II: CPT | Mod: PBBFAC,,, | Performed by: SURGERY

## 2019-04-10 PROCEDURE — 99024 PR POST-OP FOLLOW-UP VISIT: ICD-10-PCS | Mod: POP,,, | Performed by: SURGERY

## 2019-04-10 PROCEDURE — 99212 OFFICE O/P EST SF 10 MIN: CPT | Mod: PBBFAC,PO | Performed by: SURGERY

## 2019-04-10 PROCEDURE — 99024 POSTOP FOLLOW-UP VISIT: CPT | Mod: POP,,, | Performed by: SURGERY

## 2019-04-10 PROCEDURE — 99999 PR PBB SHADOW E&M-EST. PATIENT-LVL II: ICD-10-PCS | Mod: PBBFAC,,, | Performed by: SURGERY

## 2019-04-10 NOTE — PROGRESS NOTES
SP skin tear of the arm in a trap door fashion.  Seen in ER and skin placed back down.  It appears to have taken.  Bacitracin ointment TID.  RTC in 3 weeks

## 2019-05-13 ENCOUNTER — OFFICE VISIT (OUTPATIENT)
Dept: NEUROLOGY | Facility: CLINIC | Age: 84
End: 2019-05-13

## 2019-05-13 DIAGNOSIS — Z00.6 RESEARCH EXAM: Primary | ICD-10-CM

## 2019-05-13 PROCEDURE — 99499 UNLISTED E&M SERVICE: CPT | Mod: S$GLB,,, | Performed by: CLINICAL NEUROPSYCHOLOGIST

## 2019-05-13 PROCEDURE — 99499 NO LOS: ICD-10-PCS | Mod: S$GLB,,, | Performed by: CLINICAL NEUROPSYCHOLOGIST

## 2019-05-14 NOTE — PROGRESS NOTES
[facility] Research Televisit Note           Consultation started:   The patient's room #: 07  Spoke CNA at bedside with patient assisting consultant. Also present with the patient at the time of the consultation:      HISTORY OF PRESENT ILLNESS AND CURRENT CONCERNS:  Tino Mendoza is a 89 y.o. male who presents with dementia. They are a participant in the [facility] dementia research intervention.      Previous Month      Falls: 1    ER Visits: 1   Hospitalizations: 0      Treatment Plan and Update          Follow-up next month

## 2019-06-14 ENCOUNTER — OFFICE VISIT (OUTPATIENT)
Dept: NEUROLOGY | Facility: CLINIC | Age: 84
End: 2019-06-14

## 2019-06-14 DIAGNOSIS — Z00.6 RESEARCH EXAM: Primary | ICD-10-CM

## 2019-06-14 PROCEDURE — 99499 UNLISTED E&M SERVICE: CPT | Mod: S$GLB,,, | Performed by: PSYCHIATRY & NEUROLOGY

## 2019-06-14 PROCEDURE — 99499 NO LOS: ICD-10-PCS | Mod: S$GLB,,, | Performed by: PSYCHIATRY & NEUROLOGY

## 2019-06-14 NOTE — PROGRESS NOTES
[facility] Research Televisit Note           Consultation started:   The patient's room #: 07  Spoke CNA at bedside with patient assisting consultant. Also present with the patient at the time of the consultation:      HISTORY OF PRESENT ILLNESS AND CURRENT CONCERNS:  Tino Mendoza is a 89 y.o. male who presents with dementia. They are a participant in the [facility] dementia research intervention.      Previous Month      Falls: 0    ER Visits: 0    Hospitalizations 0      Treatment Plan and Update          Follow-up next month

## 2019-07-19 ENCOUNTER — OFFICE VISIT (OUTPATIENT)
Dept: NEUROLOGY | Facility: CLINIC | Age: 84
End: 2019-07-19

## 2019-07-19 DIAGNOSIS — Z00.6 RESEARCH EXAM: Primary | ICD-10-CM

## 2019-07-19 PROCEDURE — 99499 NO LOS: ICD-10-PCS | Mod: S$GLB,,, | Performed by: PSYCHIATRY & NEUROLOGY

## 2019-07-19 PROCEDURE — 99499 UNLISTED E&M SERVICE: CPT | Mod: S$GLB,,, | Performed by: PSYCHIATRY & NEUROLOGY

## 2019-07-19 NOTE — PROGRESS NOTES
[facility] Research Televisit Note           Consultation started:   The patient's room #: 07  Spoke CNA at bedside with patient assisting consultant. Also present with the patient at the time of the consultation:      HISTORY OF PRESENT ILLNESS AND CURRENT CONCERNS:  Tino Mendoza is a 89 y.o. male who presents with dementia. They are a participant in the [facility] dementia research intervention.      Previous Month      Falls: 0    ER Visits: 0   Hospitalizations:0      Treatment Plan and Update          Follow-up in 1 month

## 2019-08-16 ENCOUNTER — OFFICE VISIT (OUTPATIENT)
Dept: NEUROLOGY | Facility: CLINIC | Age: 84
End: 2019-08-16

## 2019-08-16 DIAGNOSIS — Z00.6 RESEARCH EXAM: Primary | ICD-10-CM

## 2019-08-16 PROCEDURE — 99499 UNLISTED E&M SERVICE: CPT | Mod: S$GLB,,, | Performed by: PSYCHIATRY & NEUROLOGY

## 2019-08-16 PROCEDURE — 99499 NO LOS: ICD-10-PCS | Mod: S$GLB,,, | Performed by: PSYCHIATRY & NEUROLOGY

## 2019-08-16 NOTE — PROGRESS NOTES
[facility] Research Televisit Note           Consultation started:   The patient's room #: 07  Spoke CNA at bedside with patient assisting consultant. Also present with the patient at the time of the consultation:      HISTORY OF PRESENT ILLNESS AND CURRENT CONCERNS:  Tino Mendoza is a 89 y.o. male who presents with dementia. They are a participant in the [facility] dementia research intervention.      Previous Month      Falls: 0    ER Visits: 0   Hospitalizations: 0      Treatment Plan and Update          Follow-up in one month

## 2019-09-18 ENCOUNTER — RESEARCH ENCOUNTER (OUTPATIENT)
Dept: NEUROLOGY | Facility: CLINIC | Age: 84
End: 2019-09-18

## 2019-09-19 NOTE — PROGRESS NOTES
[facility] Research Note Final Visit       Consultation started:   The patient's room #: 07  Spoke CNA at bedside with patient assisting consultant. Also present with the patient at the time of the consultation:      HISTORY OF PRESENT ILLNESS AND CURRENT CONCERNS:  Tino Mendoza is a 89 y.o. male who presents with dementia. They are a participant in the [facility].      Previous Month   Falls: 0    ER Visits: 0   Hospitalizations:0      MOCA = 1    CDR = 3      Treatment Plan and Update            Study completed

## 2020-01-23 ENCOUNTER — RESEARCH ENCOUNTER (OUTPATIENT)
Dept: RESEARCH | Facility: HOSPITAL | Age: 85
End: 2020-01-23

## 2020-02-12 NOTE — PROGRESS NOTES
Consenting occurred at [facility] with subject's Legally Authorized Representative.       Study title: Kei Manzanares    IRB #: [id number]  IRB approval date: 01-  Sponsor: Kei       Informed Consent Process  Present for discussion: Yes  Is LAR Consenting for Subject: Yes     Prior to the Informed Consent (IC) being signed, or any protocol required testing, procedure, or intervention being performed, the following was done or discussed:  · Purpose of the Study, Qualifications to Participate: yes  · Study Design, Schedule and Procedures: yes  · Risks, Benefits, Alternative Treatments, Compensation and Costs: yes  · Confidentiality and HIPAA Authorization for Release of Medical Records for the research trial/subject's right/study related injury: yes  · Study related contact information: yes  · Voluntary Participation and Withdrawal from the research trial at any time: yes  · Optional samples/procedures (if applicable): yes  · Patient has been offered the opportunity to ask questions regarding the study and all questions were answered satisfactorily: yes  · Patient verbalizes understanding of the study/procedures and agrees to participate: Diminished Capacity- LAR verbalized understanding  · CRC and PI contact information given to patient: yes  · Signed copy given to patient: Emailed to LAR  · Copy in patient's chart and original uploaded to Epic: yes     Person Obtaining Consent: Maria A Barros     Research study explained to LAR in group setting. LAR given time to review and consider trial and ask all necessary questions.  When possible, subject was presented with study information.  Given capacity (advanced dementia/Alzheimer's) subject may not have fully understood study. When possible, subject also signed consent.      Baseline assessment completed:   NPI, MOCA, QoLAD, Neuropsych Interview and Wellness plan executed

## 2020-02-20 ENCOUNTER — RESEARCH ENCOUNTER (OUTPATIENT)
Dept: RESEARCH | Facility: HOSPITAL | Age: 85
End: 2020-02-20

## 2020-05-05 ENCOUNTER — PATIENT MESSAGE (OUTPATIENT)
Dept: WOUND CARE | Facility: CLINIC | Age: 85
End: 2020-05-05

## 2024-05-06 NOTE — PROGRESS NOTES
Patient plans to see orthopedics in near future.   [facility] Research Intake Note           Consultation started: 8/30/18 at 2:00 PM   The chief complaint leading to consultation is: Research Visit  This consultation was requested by: [facility]  The patient location is: [facility]  The patient's room #: a07  Spoke CNA at bedside with patient assisting consultant. Also present with the patient at the time of the consultation:    Demographic Information   Primary Caregiver: Clara Mendoza   Primary [phone]    Primary Email: [email or web address]   Secondary Caregiver: Lucille Mendoza   Secondary Phone: [facility]    Secondary Email: na    Primary Doctor: Scott Galvez MD Phone: [phone]    [facility] Admit Date: 12/5/17         HISTORY OF PRESENT ILLNESS AND CURRENT CONCERNS:  Tino Mendoza is a 89 y.o. male who presents with dementia. They are a participant in the [facility] dementia research intervention.      Nonmotor/Premotor ROS:  RBD/sleep issues (Constitutional)?No  Depression/anxiety (Psychiatric)?Yes  Fatigue (Constitutional)?No  Falls (Musculoskeletal)?No  Psychoses (Psychiatric)?No  Pain/Paresthesia (Neurologic)?No  Appetite: Good  Mood: Ok  Choking or swallowing difficulties: No    Meds:   Current Outpatient Medications on File Prior to Visit   Medication Sig Dispense Refill    iron-vitamin C 100-250 mg, ICAR-C, 100-250 mg Tab Take by mouth once daily.      losartan (COZAAR) 50 MG tablet Take 50 mg by mouth once daily.      multivitamin with minerals tablet Take 1 tablet by mouth once daily.      polyethylene glycol (GLYCOLAX) 17 gram PwPk Take by mouth 2 (two) times daily.       No current facility-administered medications on file prior to visit.        Exam:  There were no vitals taken for this visit.    Constitutional  Well-developed, well-nourished, appears stated age       Cardiovascular  Radial pulses 2+ and symmetric, no LE edema  bilaterally   Neurological    * Cranial nerves       - CN II  PERRL     - CN III, IV, VI  Extraocular movements full, normal pursuits and saccades     - CN V  Sensation V1 - V3 intact     - CN VII  Face strong and symmetric bilaterally     - CN VIII  Hearing intact bilaterally. Decrease in rear     - CN IX, X  Palate raises midline and symmetric     - CN XI  SCM and trapezius 5/5 bilaterally     - CN XII  Tongue midline   * Motor  Muscle bulk normal, strength 5/5 throughout. Except perhaps L tricep   * Sensory   Intact to temperature and vibration throughout   * Coordination  No dysmetria with finger-to-nose    * Gait  See below.   * Deep tendon reflexes  2+ and symmetric throughout      * Specialized movement exam  No hypophonic speech.    No facial masking.   No cogwheel rigidity. In BUE but does not understand for BLE + intentionally tightens legs    No bradykinesia.   No tremor with rest, posture, kinesis, or intention.    No other dystonia, chorea, athetosis, myoclonus, or tics.   Slt stooped posture.   Slowed pace  Stead   No postural instability.        Diagnoses:          No diagnosis found.    Medical Decision Making:    Consider memory medication if desired by family        For neurocognitive exam- subject was very drowsy in bed  Cognitive/Behavioral Status   Recent MoCA Score: 2    CDR Score: 3    NPI Score: NA    Last 3 months behavior: NA   Sleep Status:     Activity Participation: NA      [place] ADL: 57   CAMS:NA   Identified Cognitive/Behavioral Issues:      Physical/Medical/Medication Status   Active Problems:   Patient Active Problem List   Diagnosis    History of nuclear stress test    Mitral valve regurgitation    Bladder cancer    Hx SBO    HTN (hypertension)    High cholesterol    GERD (gastroesophageal reflux disease)      Psychotropic Medications: Trazadone 75mg           Falls: NA    Infections: NA    ER Visits: NA    Unplanned Office Visits: NA   Hospitalizations: NA      Past  Medical History:   Past Medical History:   Diagnosis Date    Bladder cancer 2/3/2015    Bladder, Prostate removal and  Permanent ileostomy 2010    GERD (gastroesophageal reflux disease) 2/3/2015    High cholesterol 2/3/2015    HTN (hypertension) 2/3/2015    Hx SBO 2/3/2015    recurrent    Mitral valve regurgitation 2/3/2015    1/15 - 2+       Past Surgical History:   Past Surgical History:   Procedure Laterality Date    BLADDER TUMOR EXCISION  2010    PROSTATE SURGERY           Caregiver/Environmental Issues   Primary Caregiver [place] (Zarit): 52   Secondary Caregiver [place] (Zarit): 41    Diet Goals: NA   Hygiene Goals: NA    QUALIDEM: 5/9/5/3/6/4   Quality of Life in [place]: 29    Other:             Treatment Plan and Update: NOT DONE      Follow-up on End of Sept telehealth visit    PAM Cool, PHD